# Patient Record
Sex: FEMALE | Race: WHITE | NOT HISPANIC OR LATINO | URBAN - METROPOLITAN AREA
[De-identification: names, ages, dates, MRNs, and addresses within clinical notes are randomized per-mention and may not be internally consistent; named-entity substitution may affect disease eponyms.]

---

## 2018-02-07 ENCOUNTER — APPOINTMENT (OUTPATIENT)
Age: 28
Setting detail: DERMATOLOGY
End: 2018-02-07

## 2018-02-07 DIAGNOSIS — L81.0 POSTINFLAMMATORY HYPERPIGMENTATION: ICD-10-CM

## 2018-02-07 DIAGNOSIS — L70.8 OTHER ACNE: ICD-10-CM

## 2018-02-07 PROCEDURE — OTHER TREATMENT REGIMEN: OTHER

## 2018-02-07 PROCEDURE — 99203 OFFICE O/P NEW LOW 30 MIN: CPT

## 2018-02-07 PROCEDURE — OTHER COUNSELING: OTHER

## 2018-02-07 PROCEDURE — OTHER MIPS QUALITY: OTHER

## 2018-02-07 PROCEDURE — OTHER PRESCRIPTION: OTHER

## 2018-02-07 RX ORDER — AZELAIC ACID 0.15 G/G
AEROSOL, FOAM TOPICAL
Qty: 1 | Refills: 2 | Status: ERX | COMMUNITY
Start: 2018-02-07

## 2018-02-07 RX ORDER — CLINDAMYCIN PHOSPHATE 10 MG/ML
LOTION TOPICAL
Qty: 1 | Refills: 4 | Status: ERX | COMMUNITY
Start: 2018-02-07

## 2018-02-07 RX ORDER — TRETINOIN 0.8 MG/G
GEL TOPICAL
Qty: 1 | Refills: 2 | Status: ERX | COMMUNITY
Start: 2018-02-07

## 2018-02-07 ASSESSMENT — LOCATION DETAILED DESCRIPTION DERM
LOCATION DETAILED: RIGHT SUPERIOR MEDIAL BUCCAL CHEEK
LOCATION DETAILED: RIGHT INFERIOR CENTRAL MALAR CHEEK
LOCATION DETAILED: LEFT INFERIOR CENTRAL MALAR CHEEK

## 2018-02-07 ASSESSMENT — LOCATION SIMPLE DESCRIPTION DERM
LOCATION SIMPLE: LEFT CHEEK
LOCATION SIMPLE: RIGHT CHEEK

## 2018-02-07 ASSESSMENT — LOCATION ZONE DERM: LOCATION ZONE: FACE

## 2018-02-07 NOTE — PROCEDURE: TREATMENT REGIMEN
Plan: Patient is aware of in-house pharmacy for finacea foam
Detail Level: Simple
Plan: Patient will call her GI doctor to be sure she can use the clindamycin topical medication BID on her face. My MA Nickie advised to use the patient portal for best way to communicate with us. She should let us know if they DON'T want her to use this and we can change it if need be. \\nPatient is aware of in-house pharmacy for Clindamycin and Retin-A (Tretinoin 0.05% would be the strength)
Discontinue Regimen: ProActive MD
Samples Given: CeraVe cleanser and lotions\\nCetaphil cleanser\\nNeutrogena hydroboost gel x1
Plan: See above treatment regimen
Otc Regimen: Wash face bid with cetaphil or CeraVe cleanser. Apply CeraVe AM with sunscreen every morning. At bedtime apply CeraVe PM lotion or Neutrogena Hydroboost Gel
Initiate Treatment: Retin A MIcro 0.08% QOHS-QHS as tolerate and CLindamycin 1% lotion BID

## 2018-03-07 ENCOUNTER — APPOINTMENT (OUTPATIENT)
Age: 28
Setting detail: DERMATOLOGY
End: 2018-03-07

## 2018-03-07 DIAGNOSIS — L70.8 OTHER ACNE: ICD-10-CM

## 2018-03-07 DIAGNOSIS — L81.0 POSTINFLAMMATORY HYPERPIGMENTATION: ICD-10-CM

## 2018-03-07 PROCEDURE — 99214 OFFICE O/P EST MOD 30 MIN: CPT

## 2018-03-07 PROCEDURE — OTHER TREATMENT REGIMEN: OTHER

## 2018-03-07 PROCEDURE — OTHER PRESCRIPTION: OTHER

## 2018-03-07 PROCEDURE — OTHER COUNSELING: OTHER

## 2018-03-07 RX ORDER — MINOCYCLINE HYDROCHLORIDE 55 MG/1
TABLET, FILM COATED, EXTENDED RELEASE ORAL
Qty: 30 | Refills: 3 | Status: ERX | COMMUNITY
Start: 2018-03-07

## 2018-03-07 ASSESSMENT — LOCATION SIMPLE DESCRIPTION DERM
LOCATION SIMPLE: LEFT CHEEK
LOCATION SIMPLE: RIGHT FOREHEAD
LOCATION SIMPLE: RIGHT CHEEK

## 2018-03-07 ASSESSMENT — LOCATION DETAILED DESCRIPTION DERM
LOCATION DETAILED: RIGHT MEDIAL FOREHEAD
LOCATION DETAILED: LEFT CENTRAL MALAR CHEEK
LOCATION DETAILED: RIGHT CENTRAL MALAR CHEEK
LOCATION DETAILED: RIGHT INFERIOR CENTRAL MALAR CHEEK

## 2018-03-07 ASSESSMENT — LOCATION ZONE DERM: LOCATION ZONE: FACE

## 2018-03-07 NOTE — PROCEDURE: TREATMENT REGIMEN
Continue Regimen: Finacea 15% topical foam apply to affected areas bid
Samples Given: Solodyn 80mg x1 bottle She can use this dose until she receives her medication, Neutrogena hydro boost x1
Detail Level: Simple
Continue Regimen: Retin A Micro 0.08% topical gel Apply a pea size amount to face \\nClindamycin 1% lotion apply to face twice daily
Plan: Reviewed SE's of Solodyn with the patient in detail. She understands that if she gets HA or Dizziness from the 80mg dose then she should d/c and wait for her 55mg from the pharmacy
Initiate Treatment: Solodyn 55mg once daily